# Patient Record
Sex: MALE | Race: WHITE
[De-identification: names, ages, dates, MRNs, and addresses within clinical notes are randomized per-mention and may not be internally consistent; named-entity substitution may affect disease eponyms.]

---

## 2018-06-24 ENCOUNTER — HOSPITAL ENCOUNTER (EMERGENCY)
Dept: HOSPITAL 17 - NEPE | Age: 63
Discharge: HOME | End: 2018-06-24
Payer: SELF-PAY

## 2018-06-24 VITALS
HEART RATE: 72 BPM | DIASTOLIC BLOOD PRESSURE: 60 MMHG | SYSTOLIC BLOOD PRESSURE: 127 MMHG | OXYGEN SATURATION: 96 % | RESPIRATION RATE: 16 BRPM

## 2018-06-24 VITALS — HEIGHT: 71 IN | BODY MASS INDEX: 34.51 KG/M2 | WEIGHT: 246.52 LBS

## 2018-06-24 DIAGNOSIS — R94.31: ICD-10-CM

## 2018-06-24 DIAGNOSIS — R55: Primary | ICD-10-CM

## 2018-06-24 DIAGNOSIS — D64.9: ICD-10-CM

## 2018-06-24 DIAGNOSIS — E86.0: ICD-10-CM

## 2018-06-24 DIAGNOSIS — J45.909: ICD-10-CM

## 2018-06-24 DIAGNOSIS — E78.5: ICD-10-CM

## 2018-06-24 DIAGNOSIS — I10: ICD-10-CM

## 2018-06-24 LAB
ALBUMIN SERPL-MCNC: 3.2 GM/DL (ref 3.4–5)
ALP SERPL-CCNC: 49 U/L (ref 45–117)
ALT SERPL-CCNC: 23 U/L (ref 12–78)
AST SERPL-CCNC: 19 U/L (ref 15–37)
BASOPHILS # BLD AUTO: 0.1 TH/MM3 (ref 0–0.2)
BASOPHILS NFR BLD: 1 % (ref 0–2)
BILIRUB SERPL-MCNC: 0.2 MG/DL (ref 0.2–1)
BUN SERPL-MCNC: 19 MG/DL (ref 7–18)
CALCIUM SERPL-MCNC: 7.9 MG/DL (ref 8.5–10.1)
CHLORIDE SERPL-SCNC: 104 MEQ/L (ref 98–107)
CREAT SERPL-MCNC: 0.84 MG/DL (ref 0.6–1.3)
EOSINOPHIL # BLD: 0.3 TH/MM3 (ref 0–0.4)
EOSINOPHIL NFR BLD: 4.2 % (ref 0–4)
ERYTHROCYTE [DISTWIDTH] IN BLOOD BY AUTOMATED COUNT: 14 % (ref 11.6–17.2)
GFR SERPLBLD BASED ON 1.73 SQ M-ARVRAT: 93 ML/MIN (ref 89–?)
GLUCOSE SERPL-MCNC: 101 MG/DL (ref 74–106)
HCO3 BLD-SCNC: 20.9 MEQ/L (ref 21–32)
HCT VFR BLD CALC: 36.8 % (ref 39–51)
HGB BLD-MCNC: 12.6 GM/DL (ref 13–17)
LYMPHOCYTES # BLD AUTO: 2.3 TH/MM3 (ref 1–4.8)
LYMPHOCYTES NFR BLD AUTO: 34.3 % (ref 9–44)
MAGNESIUM SERPL-MCNC: 2.2 MG/DL (ref 1.5–2.5)
MCH RBC QN AUTO: 31.4 PG (ref 27–34)
MCHC RBC AUTO-ENTMCNC: 34.3 % (ref 32–36)
MCV RBC AUTO: 91.8 FL (ref 80–100)
MONOCYTE #: 0.7 TH/MM3 (ref 0–0.9)
MONOCYTES NFR BLD: 10.5 % (ref 0–8)
NEUTROPHILS # BLD AUTO: 3.4 TH/MM3 (ref 1.8–7.7)
NEUTROPHILS NFR BLD AUTO: 50 % (ref 16–70)
PLATELET # BLD: 232 TH/MM3 (ref 150–450)
PMV BLD AUTO: 8 FL (ref 7–11)
PROT SERPL-MCNC: 6.2 GM/DL (ref 6.4–8.2)
RBC # BLD AUTO: 4.01 MIL/MM3 (ref 4.5–5.9)
SODIUM SERPL-SCNC: 138 MEQ/L (ref 136–145)
TROPONIN I SERPL-MCNC: (no result) NG/ML (ref 0.02–0.05)
WBC # BLD AUTO: 6.8 TH/MM3 (ref 4–11)

## 2018-06-24 PROCEDURE — 84484 ASSAY OF TROPONIN QUANT: CPT

## 2018-06-24 PROCEDURE — 83735 ASSAY OF MAGNESIUM: CPT

## 2018-06-24 PROCEDURE — 71046 X-RAY EXAM CHEST 2 VIEWS: CPT

## 2018-06-24 PROCEDURE — 96360 HYDRATION IV INFUSION INIT: CPT

## 2018-06-24 PROCEDURE — 85025 COMPLETE CBC W/AUTO DIFF WBC: CPT

## 2018-06-24 PROCEDURE — 80053 COMPREHEN METABOLIC PANEL: CPT

## 2018-06-24 PROCEDURE — 93005 ELECTROCARDIOGRAM TRACING: CPT

## 2018-06-24 PROCEDURE — 99285 EMERGENCY DEPT VISIT HI MDM: CPT

## 2018-06-24 NOTE — RADRPT
EXAM DATE:  6/24/2018 9:09 PM EDT

AGE/SEX:        62 years / Male



INDICATIONS:  Palpitations. Fainted.



CLINICAL DATA:  This is the patient's initial encounter. Patient reports that signs and symptoms have
 been present for 1 day and indicates a pain score of 0/10. 

                                                                          

MEDICAL/SURGICAL HISTORY:       None. None.



COMPARISON:      No prior exams available for comparison. 





FINDINGS:  

PA and lateral views of the chest demonstrate the lungs to be symmetrically aerated without evidence 
of mass, infiltrate or effusion. The cardiomediastinal contours are unremarkable. Osseous structures 
are intact.



CONCLUSION: 

No active disease



Electronically signed by: Josue Arias MD  6/24/2018 9:20 PM EDT

## 2018-06-24 NOTE — PD
HPI


Chief Complaint:  Syncope/Near-Syncope


Time Seen by Provider:  20:29


Travel History


International Travel<30 days:  Yes


Contact w/Intl Traveler<30days:  No


Name of Country Traveled to:  [place]


Traveled to known affect area:  No





History of Present Illness


HPI


62-year-old man, presents to the emergency department planing of a syncopal 

episode.  He states today he was playing golf outside in the heat, had a couple 

beers initially, had some beer and wine at dinner, sitting on a barstool when 

he began to feel lightheaded faint and dizzy, and then woke up with paramedics 

around him.  1 of his friends noticed that he was not working well, and lowered 

him to the ground.  He did not hit his head.  Denies any premonitory chest pain

, shortness of breath, or palpitations.  He states one time a month or so ago 

he had some dizziness while playing golf in the heat.  He is on blood pressure 

medications but does not know what.  Denies being on any diuretics.  No leg 

swelling.  Otherwise had been feeling generally well and healthy.





On EMS arrival he was diaphoretic.  Initial blood pressure was 70 systolic.





History


Past Medical History


*** Narrative Medical


Hypertension


Asthma


Hyperlipidemia





Past Surgical History


Surgical History:  No Previous Surgery





Social History


Alcohol Use:  Yes (daily)


Tobacco Use:  No





Allergies-Medications


(Allergen,Severity, Reaction):  


Coded Allergies:  


     shellfish derived (Verified  Allergy, Severe, 6/24/18)


 tongue swelling


Reported Meds & Prescriptions





Reported Meds & Active Scripts


Active


Reported


Crestor (Rosuvastatin Calcium) 20 Mg Tab 20 Mg PO DAILY


Singulair (Montelukast Sodium) 10 Mg Tab 10 Mg PO HS


Advair Diskus Inh (Fluticasone-Salmeterol Inh) 250-50 Mcg/Blist Aer 1 Puff INH 

BID


     Rinse mouth after use.








Review of Systems


Except as stated in HPI:  all other systems reviewed are Neg





Physical Exam


Narrative


GENERAL: Well-appearing 62-year-old man, no acute distress.


SKIN: Focused skin assessment warm/dry.


HEAD: Atraumatic. Normocephalic. 


EYES: Pupils equal and round. No scleral icterus. No injection or drainage. 


ENT: No nasal bleeding or discharge.  Mucous membranes pink and moist.


NECK: Trachea midline. No JVD. 


CARDIOVASCULAR: Regular rate and rhythm.  No murmur appreciated.


RESPIRATORY: No accessory muscle use. Clear to auscultation. Breath sounds 

equal bilaterally. 


GASTROINTESTINAL: Abdomen soft, non-tender, nondistended. Hepatic and splenic 

margins not palpable. 


MUSCULOSKELETAL: No obvious deformities. No clubbing.  No cyanosis.  No edema. 


NEUROLOGICAL: Awake and alert. No obvious cranial nerve deficits.  Motor 

grossly within normal limits. Normal speech.


PSYCHIATRIC: Appropriate mood and affect; insight and judgment normal.





Data


Data


Last Documented VS





Vital Signs








  Date Time  Temp Pulse Resp B/P (MAP) Pulse Ox O2 Delivery O2 Flow Rate FiO2


 


6/24/18 20:24  72 16 127/60 (82) 96   








Orders





 Orders


Electrocardiogram (6/24/18 20:44)


Complete Blood Count With Diff (6/24/18 20:44)


Comprehensive Metabolic Panel (6/24/18 20:44)


Magnesium (Mg) (6/24/18 20:44)


Troponin I (6/24/18 20:44)


Chest, Pa & Lat (6/24/18 20:44)


Ecg Monitoring (6/24/18 20:44)


Iv Access Insert/Monitor (6/24/18 20:44)


Oximetry (6/24/18 20:44)


Sodium Chloride 0.9% Flush (Ns Flush) (6/24/18 20:45)


Sodium Chlor 0.9% 1000 Ml Inj (Ns 1000 M (6/24/18 20:44)


Orthostatic Vital Signs (6/24/18 20:44)





Labs





Laboratory Tests








Test


  6/24/18


20:54


 


White Blood Count 6.8 TH/MM3 


 


Red Blood Count 4.01 MIL/MM3 


 


Hemoglobin 12.6 GM/DL 


 


Hematocrit 36.8 % 


 


Mean Corpuscular Volume 91.8 FL 


 


Mean Corpuscular Hemoglobin 31.4 PG 


 


Mean Corpuscular Hemoglobin


Concent 34.3 % 


 


 


Red Cell Distribution Width 14.0 % 


 


Platelet Count 232 TH/MM3 


 


Mean Platelet Volume 8.0 FL 


 


Neutrophils (%) (Auto) 50.0 % 


 


Lymphocytes (%) (Auto) 34.3 % 


 


Monocytes (%) (Auto) 10.5 % 


 


Eosinophils (%) (Auto) 4.2 % 


 


Basophils (%) (Auto) 1.0 % 


 


Neutrophils # (Auto) 3.4 TH/MM3 


 


Lymphocytes # (Auto) 2.3 TH/MM3 


 


Monocytes # (Auto) 0.7 TH/MM3 


 


Eosinophils # (Auto) 0.3 TH/MM3 


 


Basophils # (Auto) 0.1 TH/MM3 


 


CBC Comment DIFF FINAL 


 


Differential Comment  


 


Blood Urea Nitrogen 19 MG/DL 


 


Creatinine 0.84 MG/DL 


 


Random Glucose 101 MG/DL 


 


Total Protein 6.2 GM/DL 


 


Albumin 3.2 GM/DL 


 


Calcium Level 7.9 MG/DL 


 


Magnesium Level 2.2 MG/DL 


 


Alkaline Phosphatase 49 U/L 


 


Aspartate Amino Transf


(AST/SGOT) 19 U/L 


 


 


Alanine Aminotransferase


(ALT/SGPT) 23 U/L 


 


 


Total Bilirubin 0.2 MG/DL 


 


Sodium Level 138 MEQ/L 


 


Potassium Level 3.1 MEQ/L 


 


Chloride Level 104 MEQ/L 


 


Carbon Dioxide Level 20.9 MEQ/L 


 


Anion Gap 13 MEQ/L 


 


Estimat Glomerular Filtration


Rate 93 ML/MIN 


 


 


Troponin I


  LESS THAN 0.02


NG/ML











MDM


Medical Decision Making


Medical Screen Exam Complete:  Yes


Emergency Medical Condition:  Yes


Interpretation(s)


My review of EKG: Normal sinus rhythm with some PVCs, small inferior Q waves 

and nonspecific inferior T-wave inversions.  Some premature complexes.  No 

definite evidence of acute ischemia or evidence of arrhythmia.





LABS:


CBC remarkable for mild anemia.


CMP remarkable for elevated BUN.  Will bit a low protein.





Chest x-ray negative


Differential Diagnosis


Syncope, orthostasis, dehydration, arrhythmia, ACS, other


Narrative Course


Medical decision making





This 62-year-old man who presents to the emergency department after syncopal 

episode.  Multiple possible contributing factors including exertion in the heat

, dehydration, alcohol use, and medications.  Difficult to completely exclude 

arrhythmia.  Will check labs, EKG, x-ray, reassess.





FINAL: I think this is he related syncope with concomitant factors of 

medications, alcohol, sitting up in this high stool for period of time.  I do 

not see any evidence of arrhythmia.  His BUNs a little bit elevated which would 

suggest dehydration.  Elevated BUN could also suggest GI bleed he does have 

mild anemia.  Denies any dark black stools or blood in his stools.  He will 

monitor for this.  Follow-up with his primary physician or return to the 

emergency department.





Diagnosis





 Primary Impression:  


 Syncope and collapse


 Additional Impression:  


 Dehydration





***Additional Instructions:  


Drink plenty fluids stay well-hydrated.





Drink plenty fluids when you are on the heat, avoid alcohol while you are on 

the heat.





Return to the emergency part for any chest pain, palpitations, shortness of 

breath, recurrent fainting spells, or any other new or worsening symptoms.


***Med/Other Pt SpecificInfo:  No Change to Meds


Disposition:  01 DISCHARGE HOME


Condition:  Flex Harrell MD Jun 24, 2018 20:50

## 2018-06-25 NOTE — EKG
Date Performed: 06/24/2018       Time Performed: 20:26:53

 

PTAGE:      62 years

 

EKG:      Sinus rhythm 

 

 WITH FREQUENT SUPRAVENTRICULAR PREMATURE COMPLEXES NONSPECIFIC T-WAVE ABNORMALITY ABNORMAL RHYTHM EC
G 

 

NO PREVIOUS TRACING            

 

DOCTOR:   Alistair Hamlin  Interpretating Date/Time  06/25/2018 23:09:15